# Patient Record
Sex: FEMALE | Race: WHITE
[De-identification: names, ages, dates, MRNs, and addresses within clinical notes are randomized per-mention and may not be internally consistent; named-entity substitution may affect disease eponyms.]

---

## 2018-06-22 ENCOUNTER — HOSPITAL ENCOUNTER (OUTPATIENT)
Dept: HOSPITAL 89 - MAMO | Age: 68
End: 2018-06-22
Attending: FAMILY MEDICINE
Payer: COMMERCIAL

## 2018-06-22 DIAGNOSIS — Z12.31: Primary | ICD-10-CM

## 2018-06-22 PROCEDURE — 77067 SCR MAMMO BI INCL CAD: CPT

## 2018-06-22 PROCEDURE — 77063 BREAST TOMOSYNTHESIS BI: CPT

## 2018-06-22 NOTE — RADIOLOGY IMAGING REPORT
FACILITY: [facility] 

 

PATIENT NAME: GAYATHRI MENDOZA

: [id number]

MR: [id number]

V: [id number]

EXAM DATE: 

ORDERING PHYSICIAN: WESTON MERRILL

TECHNOLOGIST: Kandice Stephenson

 

PROCEDURE:BILATERAL DIGITAL SCREENING MAMMOGRAM WITH CAD ASSISTED 

INTERPRETATION & 3D TOMOSYNTHESIS 

 

COMPARISON:Prior mammograms 17, 4/15/16, 2/3/15, 14, 

12, 11.

 

INDICATIONS:SCREENING

 

FINDINGS:  

Dense heterogeneous fibroglandular tissue is seen throughout the 

breasts. The parenchymal pattern has remained stable allowing for 

difference in mammographic technique & patient positioning. There is 

no evidence of malignant appearing mass, malignant appearing 

calcifications or other secondary sign of malignancy in either breast. 

A biopsy clip in the 9 o'clock position of the Right breast is again 

seen. 

 

DIAGNOSTIC CATEGORY 2--BENIGN FINDING.  

 

RECOMMENDATIONS:

ROUTINE MAMMOGRAM AND CLINICAL EVALUATION.   

 

IMPRESSION:

BIRADS 2: Benign finding. 

No significant abnormality is seen.

 

 

 

 

 

 

 

 

 

Dictated by:  Tarah Goss M.D. on 2018 at 9:54   

Transcribed by: FIX on 2018 at 10:07    

Approved by:  Tarah Goss M.D. on 2018 at 11:27   

[facility]

## 2018-12-10 ENCOUNTER — HOSPITAL ENCOUNTER (OUTPATIENT)
Dept: HOSPITAL 89 - LAB | Age: 68
End: 2018-12-10
Attending: INTERNAL MEDICINE
Payer: COMMERCIAL

## 2018-12-10 DIAGNOSIS — I48.0: Primary | ICD-10-CM

## 2018-12-10 LAB — PLATELET COUNT, AUTOMATED: 226 K/UL (ref 150–450)

## 2018-12-10 PROCEDURE — 84132 ASSAY OF SERUM POTASSIUM: CPT

## 2018-12-10 PROCEDURE — 36415 COLL VENOUS BLD VENIPUNCTURE: CPT

## 2018-12-10 PROCEDURE — 85027 COMPLETE CBC AUTOMATED: CPT

## 2018-12-10 PROCEDURE — 82374 ASSAY BLOOD CARBON DIOXIDE: CPT

## 2018-12-10 PROCEDURE — 84295 ASSAY OF SERUM SODIUM: CPT

## 2018-12-10 PROCEDURE — 82310 ASSAY OF CALCIUM: CPT

## 2018-12-10 PROCEDURE — 82947 ASSAY GLUCOSE BLOOD QUANT: CPT

## 2018-12-10 PROCEDURE — 84520 ASSAY OF UREA NITROGEN: CPT

## 2018-12-10 PROCEDURE — 82435 ASSAY OF BLOOD CHLORIDE: CPT

## 2018-12-10 PROCEDURE — 82565 ASSAY OF CREATININE: CPT

## 2019-02-06 ENCOUNTER — HOSPITAL ENCOUNTER (EMERGENCY)
Dept: HOSPITAL 89 - ER | Age: 69
Discharge: HOME | End: 2019-02-06
Payer: COMMERCIAL

## 2019-02-06 VITALS — SYSTOLIC BLOOD PRESSURE: 114 MMHG | DIASTOLIC BLOOD PRESSURE: 71 MMHG

## 2019-02-06 DIAGNOSIS — S52.612A: ICD-10-CM

## 2019-02-06 DIAGNOSIS — S52.532A: Primary | ICD-10-CM
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
## 2019-02-06 PROCEDURE — 99156 MOD SED OTH PHYS/QHP 5/>YRS: CPT

## 2019-02-06 PROCEDURE — 96375 TX/PRO/DX INJ NEW DRUG ADDON: CPT

## 2019-02-06 PROCEDURE — 96374 THER/PROPH/DIAG INJ IV PUSH: CPT

## 2019-02-06 PROCEDURE — 99285 EMERGENCY DEPT VISIT HI MDM: CPT

## 2019-02-06 PROCEDURE — 25605 CLTX DST RDL FX/EPHYS SEP W/: CPT

## 2019-02-06 PROCEDURE — 73110 X-RAY EXAM OF WRIST: CPT

## 2019-02-06 NOTE — RADIOLOGY IMAGING REPORT
FACILITY: [facility] [place] 

 

PATIENT NAME: Feli Franz

: 1950

MR: [id number]

V: [id number]

EXAM DATE: 

ORDERING PHYSICIAN: TELMA VAZQUEZ

TECHNOLOGIST: 

 

Location: [facility]

Patient: Feli Franz

: 1950

MRN: [id number]

Visit/Account:[id number]

Date of Sevice:  2019

 

ACCESSION #: [id number]

 

Exam type: XR WRIST 3 OR MORE VIEWS LT

 

History: post reduction

 

Comparison: Left wrist series performed earlier in the day.

 

Findings:

 

The left wrist is viewed through a fiberglass cast.  There has been interval reduction of the comminu
jose intra-articular fracture of the distal left radius which has been reduced in good anatomic alignm
ent.  Small avulsion fracture the ulnar styloid again noted.  Degenerative changes involving the firs
t and second carpal metacarpal articulations in the trapezium navicular articulation again noted

 

IMPRESSION:

 

1.  There is been interval reduction of the previous described fractures through the distal left radi
us and ulna which now appear in good anatomic alignment

 

Report Dictated By: Tarah Goss MD at 2019 1:08 PM

 

Report E-Signed By: Tarah Goss MD  at 2019 1:11 PM

 

WSN:GABY

## 2019-02-06 NOTE — ER REPORT
History and Physical


Time Seen By MD:  11:28


HPI/ROS


CHIEF COMPLAINT: Left wrist fracture





HISTORY OF PRESENT ILLNESS: 68-year-old female patient persists to emergency 


room with complaint of left wrist fracture. Patient states she was walking to 


work this morning when she slipped on the ice and fell on her left arm. Patient 


states she has significant pain to the left wrist. She did go to urgent care, 


where she was evaluated, and x-rays done. She is told that she does have a 


comminuted fracture of the distal radius. At that time the decision was made to 


refer her to the emergency room for possible reduction.


Allergies:  


Coded Allergies:  


     No Known Drug Allergies (Unverified , 2/16/17)


Home Meds


Active Scripts


Hydrocodone Bit/Acetaminophen (HYDROCODON-ACETAMINOPHEN 5-325) 1 Each Tablet, 1 


EACH PO Q4-6H PRN for PAIN, #12 TAB


   Prov:TELMA VAZQUEZ         2/6/19


Reported Medications


Apixaban (ELIQUIS) 5 Mg Tablet, 5 MG PO BID


   2/6/19


Metoprolol Tartrate (METOPROLOL TARTRATE) 25 Mg Tablet, 12.5 MG PO BID, TAB


   2/6/19


Levothyroxine Sodium (LEVOTHYROXINE SODIUM) 50 Mcg Tablet, 50 MCG PO QDAY, TAB


   2/6/19


Omega-3/Dha/Epa/Fish Oil (FISH OIL 1,000 MG SOFTGEL) 1 Each Capsule, 1 EACH PO, 


CAPSULE


   2/16/17


Montelukast Sodium (MONTELUKAST SODIUM) 10 Mg Tablet, 1 TAB PO QDAY, TAB


   2/16/17


Discontinued Reported Medications


Fluticasone Prop 50 Mcg Ns (FLONASE 50 MCG NS) 16 Gm Spray.susp, 2 SPRAYS NS 


QDAY, BOT


   2/16/17


Levothyroxine Sodium (LEVOTHYROXINE SODIUM) 75 Mcg Tablet, 0.5 TAB PO QDAY, TAB


   2/16/17


Discontinued Scripts


Metoprolol Tartrate (METOPROLOL TARTRATE) 25 Mg Tablet, 0.5 TAB PO BID, #10 TAB


   Prov:ROSENDO LAZAR PA-C         5/8/17


Past Medical/Surgical History


Patient has a past medical history of migraines, osteoporosis, hypothyroidism, 


alcohol use, patient currently taking Eliquis.


Patient denies any surgical history.


Reviewed Nurses Notes:  Yes


Hx Smoking:  No


Hx Substance Use Disorder:  No


Hx Alcohol Use:  Yes


Constitutional





Vital Sign - Last 24 Hours








 2/6/19 2/6/19 2/6/19 2/6/19





 11:29 11:39 11:46 11:54


 


Temp 97.5   


 


Pulse 96 ???  85


 


Resp 16   10


 


B/P (MAP) 127/75  120/83 (95) 


 


Pulse Ox 94   94


 


O2 Delivery Room Air   


 


    





 2/6/19 2/6/19 2/6/19 2/6/19





 11:57 12:00 12:09 12:14


 


Pulse   86 


 


Resp   18 


 


B/P (MAP) 106/70 (82) 119/89 (99)  114/71 (85)


 


Pulse Ox   99 








Physical Exam


   General appearance: Alert no distress.


Respiratory: Chest is non tender, lungs are clear to auscultation.


Cardiac: Regular rate and rhythm.


ENT: If his membranes are moist.


Musculoskeletal: Patient does have a bow to the right wrist, good sensation to 


the ulnar radial medial nerves. Patient is able to move all the fingers 


appropriately.





DIFFERENTIAL DIAGNOSIS: After history and physical exam differential diagnosis 


was considered for distal radial fracture, ulnar styloid fracture, this was 


fractured and needs to be reduced.





Medical Decision Making


EKG/Imaging


Imaging


Exam type: XR WRIST 3 OR MORE VIEWS LT


 


History: post reduction


 


Comparison: Left wrist series performed earlier in the day.


 


Findings:


 


The left wrist is viewed through a fiberglass cast.  There has been interval 


reduction of the comminuted intra-articular fracture of the distal left radius 


which has been reduced in good anatomic alignment.  Small avulsion fracture the 


ulnar styloid again noted.  Degenerative changes involving the first and second 


carpal metacarpal articulations in the trapezium navicular articulation again 


noted


 


IMPRESSION:


 


1.  There is been interval reduction of the previous described fractures through


the distal left radius and ulna which now appear in good anatomic alignment


 


Report Dictated By: Tarah Goss MD at 2/6/2019 1:08 PM


 


Report E-Signed By: Tarah Goss MD  at 2/6/2019 1:11 PM





ED Course/Re-evaluation


ED Course


Patient was admitted to an exam room, history and physical were obtained. 


Differential diagnoses were considered. On examination patient has obvious 


deformity of the left wrist, she has good sensation with the ulnar, radial and 


median nerve. She is able to move the fingers without any difficulties. Patient 


had been seen at urgent care, I removed the splint and evaluated the wrist. It 


appeared that patient didn't need to have a reduction done. I discussed this 


with the patient who verbalized understanding and agreement. We did go over the 


risks associated with conscious sedation and she verbalized understanding. 


Conscious sedation, reduction and splinting were done as described below. Repeat


x-rays were done which showed significant improvement in the alignment. Patient 


will be discharged at this time. She will give him a limited prescription for 


hydrocodone with Tylenol. She is to return to the emergency room with any 


numbness or uncontrollable pain in the hand. She is to ice her wrist through the


splint. Patient verbalized understanding and agreement with plan.








Procedure: Procedural sedation.





A pre-sedation evaluation was completed on the patient at 1150.  Patient is an 


appropriate candidate for procedural sedation.  The risks of the sedation were 


discussed with the patient.  The patient was reevaluated immediately prior to 


initiation of sedation.  The patient was sedated with 60 mg of propofol. The 


patient was monitored with continuous pulse oximetry and EKG monitor.  There 


were no complications and no significant hypoxemia.  I remained at the bedside 


for the sedation.  The total time I spent in the procedural sedation was 10 


minutes.  Post sedation evaluation:  Patient was alert and cooperative, 


hemodynamically stable with appropriate respiratory status, temperature and pain


control without ongoing nausea and vomiting. Sedation was performed by Dr. De Luna.





 


Procedure: Dislocation reduction. 





The wrist was reduced in the usual fashion without complications.  Post 


reduction the patient's neurovascular exam is normal.


Post reduction x-ray demonstrates reduction of the joint to the anatomic 


position. 


The procedure was performed by myself.








Procedure:  Splint placement.





A sugar tong static splint was applied due to wrist fracture.  After application


of the splint I re-examined the patient.  The splint was adequately immobilizing


the joint and distal to the splint the patient's circulation and sensation was 


intact.


Decision to Disposition Date:  Feb 6, 2019


Decision to Disposition Time:  12:49





Depart


Departure


Latest Vital Signs





Vital Signs








  Date Time  Temp Pulse Resp B/P (MAP) Pulse Ox O2 Delivery O2 Flow Rate FiO2


 


2/6/19 12:14    114/71 (85)    


 


2/6/19 12:09  86 18  99   


 


2/6/19 11:29 97.5     Room Air  








Impression:  


   Primary Impression:  


   Distal radius fracture, left


   Additional Impression:  


   Fracture of ulnar styloid


Condition:  Improved


Disposition:  HOME OR SELF-CARE


Referrals:  


ALEJA MARIANO MD


New Scripts


Hydrocodone Bit/Acetaminophen (HYDROCODON-ACETAMINOPHEN 5-325) 1 Each Tablet


1 EACH PO Q4-6H PRN for PAIN, #12 TAB


   Prov: TELMA VAZQUEZ         2/6/19


Patient Instructions:  Wrist Fracture in Adults (ED)





Additional Instructions:  


Limit activity by pain.


Ice the wrist through the splint; 2-3 times a day for 20-30 minutes.


If the splint is feeling too tight you may loosen the ace wrap and rewrap it.


Follow up with [facility], call this afternoon or tomorrow to make 


an appointment.


Keep the splint dry, wrap it with a bag and tape to keep the water out.


Return to the ER with uncontrollable pain or numbness to the hand.


You may take Ibuprofen as needed for pain in addition to the pain medication.


Don't take any additional Tylenol while on the pain medication.





Problem Qualifiers








   Primary Impression:  


   Distal radius fracture, left


   Encounter type:  initial encounter  Fracture type:  closed  Fracture 


   morphology:  Colles'  Qualified Codes:  S52.532A - Colles' fracture of left 


   radius, initial encounter for closed fracture


   Additional Impression:  


   Fracture of ulnar styloid


   Encounter type:  initial encounter  Fracture type:  closed  Fracture alignm


   ent:  displaced  Laterality:  left  Qualified Codes:  S52.612A - Displaced f


   racture of left ulna styloid process, initial encounter for closed fracture








TELMA VAZQUEZ                 Feb 6, 2019 11:28

## 2019-03-20 ENCOUNTER — HOSPITAL ENCOUNTER (OUTPATIENT)
Dept: HOSPITAL 89 - RESP | Age: 69
End: 2019-03-20
Attending: INTERNAL MEDICINE
Payer: COMMERCIAL

## 2019-03-20 DIAGNOSIS — Z01.810: Primary | ICD-10-CM

## 2019-03-20 DIAGNOSIS — G47.33: ICD-10-CM

## 2019-03-20 DIAGNOSIS — I48.0: ICD-10-CM

## 2019-08-16 ENCOUNTER — HOSPITAL ENCOUNTER (OUTPATIENT)
Dept: HOSPITAL 89 - MAMO | Age: 69
End: 2019-08-16
Attending: FAMILY MEDICINE
Payer: COMMERCIAL

## 2019-08-16 DIAGNOSIS — Z12.31: Primary | ICD-10-CM

## 2019-08-16 PROCEDURE — 77063 BREAST TOMOSYNTHESIS BI: CPT

## 2019-08-16 PROCEDURE — 77067 SCR MAMMO BI INCL CAD: CPT

## 2019-08-16 NOTE — RADIOLOGY IMAGING REPORT
FACILITY: [facility] [place] 

 

PATIENT NAME: GAYATHRI MENDOZA

: [id number]

MR: [id number]

V: [id number]

EXAM DATE: [id number]

ORDERING PHYSICIAN: WESTON MERRILL

TECHNOLOGIST: Kandice Stephenson

 

PROCEDURE: BILATERAL DIGITAL SCREENING MAMMOGRAM WITH CAD ASSISTED 

INTERPRETATION & 3D TOMOSYNTHESIS 

 

REASON FOR STUDY:  Screening.

 

FAMILY HISTORY OF BREAST CANCER:  None.

 

BREAST PROCEDURES/TREATMENTS:  Benign Ultrasound guided biopsy of the 

Right breast.

 

COMPARISON:  18, 17, 4/15/16, 2/3/15, 14, 12.

 

VIEWS OBTAINED:  Bilateral 2D & 3D full field CC & MLO projections.

 

BREAST DENSITY:  The breasts are heterogeneously dense which can 

obscure small masses.

 

MAMMOGRAM FINDINGS: The parenchymal pattern has remained stable 

allowing for difference in mammographic technique & patient 

positioning.

 

IMPRESSION: 

BIRADS 1: Negative.

 

DIAGNOSTIC CATEGORY 1--NEGATIVE.  

 

 

RECOMMENDATIONS:

ROUTINE MAMMOGRAM AND CLINICAL EVALUATION.   

 

Dictated by:  Tarah Goss M.D. on 2019 at 11:36   

Transcribed by: FIX on 2019 at 13:55    

Approved by:  Tarah Goss M.D. on 2019 at 14:10   

[facility]